# Patient Record
Sex: FEMALE | Race: WHITE | NOT HISPANIC OR LATINO | Employment: UNEMPLOYED | URBAN - NONMETROPOLITAN AREA
[De-identification: names, ages, dates, MRNs, and addresses within clinical notes are randomized per-mention and may not be internally consistent; named-entity substitution may affect disease eponyms.]

---

## 2019-02-01 ENCOUNTER — APPOINTMENT (OUTPATIENT)
Dept: MRI IMAGING | Facility: HOSPITAL | Age: 49
End: 2019-02-01

## 2019-02-01 ENCOUNTER — HOSPITAL ENCOUNTER (EMERGENCY)
Facility: HOSPITAL | Age: 49
Discharge: HOME | End: 2019-02-01
Attending: EMERGENCY MEDICINE | Admitting: EMERGENCY MEDICINE

## 2019-02-01 VITALS
DIASTOLIC BLOOD PRESSURE: 71 MMHG | OXYGEN SATURATION: 97 % | TEMPERATURE: 97.5 F | HEART RATE: 60 BPM | RESPIRATION RATE: 18 BRPM | WEIGHT: 263.2 LBS | SYSTOLIC BLOOD PRESSURE: 119 MMHG | BODY MASS INDEX: 38.98 KG/M2 | HEIGHT: 69 IN

## 2019-02-01 DIAGNOSIS — G89.29 CHRONIC LEFT-SIDED LOW BACK PAIN WITH LEFT-SIDED SCIATICA: Primary | ICD-10-CM

## 2019-02-01 DIAGNOSIS — M54.42 CHRONIC LEFT-SIDED LOW BACK PAIN WITH LEFT-SIDED SCIATICA: Primary | ICD-10-CM

## 2019-02-01 LAB
ANION GAP SERPL CALCULATED.3IONS-SCNC: 5 MMOL/L (ref 4–13)
BASOPHILS # BLD AUTO: 0.05 10*3/MM3 (ref 0–0.2)
BASOPHILS NFR BLD AUTO: 0.7 % (ref 0–2)
BUN BLD-MCNC: 13 MG/DL (ref 5–21)
BUN/CREAT SERPL: 14.8 (ref 7–25)
CALCIUM SPEC-SCNC: 9.2 MG/DL (ref 8.4–10.4)
CHLORIDE SERPL-SCNC: 104 MMOL/L (ref 98–110)
CO2 SERPL-SCNC: 29 MMOL/L (ref 24–31)
CREAT BLD-MCNC: 0.88 MG/DL (ref 0.5–1.4)
DEPRECATED RDW RBC AUTO: 40.4 FL (ref 40–54)
EOSINOPHIL # BLD AUTO: 0.12 10*3/MM3 (ref 0–0.7)
EOSINOPHIL NFR BLD AUTO: 1.6 % (ref 0–4)
ERYTHROCYTE [DISTWIDTH] IN BLOOD BY AUTOMATED COUNT: 13.2 % (ref 12–15)
GFR SERPL CREATININE-BSD FRML MDRD: 69 ML/MIN/1.73
GLUCOSE BLD-MCNC: 102 MG/DL (ref 70–100)
HCG SERPL QL: NEGATIVE
HCT VFR BLD AUTO: 39.6 % (ref 37–47)
HGB BLD-MCNC: 13.4 G/DL (ref 12–16)
IMM GRANULOCYTES # BLD AUTO: 0.06 10*3/MM3 (ref 0–0.03)
IMM GRANULOCYTES NFR BLD AUTO: 0.8 % (ref 0–5)
LYMPHOCYTES # BLD AUTO: 1.97 10*3/MM3 (ref 0.72–4.86)
LYMPHOCYTES NFR BLD AUTO: 26 % (ref 15–45)
MCH RBC QN AUTO: 28.4 PG (ref 28–32)
MCHC RBC AUTO-ENTMCNC: 33.8 G/DL (ref 33–36)
MCV RBC AUTO: 83.9 FL (ref 82–98)
MONOCYTES # BLD AUTO: 0.59 10*3/MM3 (ref 0.19–1.3)
MONOCYTES NFR BLD AUTO: 7.8 % (ref 4–12)
NEUTROPHILS # BLD AUTO: 4.79 10*3/MM3 (ref 1.87–8.4)
NEUTROPHILS NFR BLD AUTO: 63.1 % (ref 39–78)
NRBC BLD AUTO-RTO: 0 /100 WBC (ref 0–0)
PLATELET # BLD AUTO: 260 10*3/MM3 (ref 130–400)
PMV BLD AUTO: 10.4 FL (ref 6–12)
POTASSIUM BLD-SCNC: 4.2 MMOL/L (ref 3.5–5.3)
RBC # BLD AUTO: 4.72 10*6/MM3 (ref 4.2–5.4)
SODIUM BLD-SCNC: 138 MMOL/L (ref 135–145)
WBC NRBC COR # BLD: 7.58 10*3/MM3 (ref 4.8–10.8)
WHOLE BLOOD HOLD SPECIMEN: NORMAL

## 2019-02-01 PROCEDURE — 80048 BASIC METABOLIC PNL TOTAL CA: CPT | Performed by: EMERGENCY MEDICINE

## 2019-02-01 PROCEDURE — 25010000002 ONDANSETRON PER 1 MG: Performed by: EMERGENCY MEDICINE

## 2019-02-01 PROCEDURE — 96375 TX/PRO/DX INJ NEW DRUG ADDON: CPT

## 2019-02-01 PROCEDURE — 85025 COMPLETE CBC W/AUTO DIFF WBC: CPT | Performed by: EMERGENCY MEDICINE

## 2019-02-01 PROCEDURE — 99284 EMERGENCY DEPT VISIT MOD MDM: CPT

## 2019-02-01 PROCEDURE — 72148 MRI LUMBAR SPINE W/O DYE: CPT

## 2019-02-01 PROCEDURE — 96374 THER/PROPH/DIAG INJ IV PUSH: CPT

## 2019-02-01 PROCEDURE — 84703 CHORIONIC GONADOTROPIN ASSAY: CPT | Performed by: EMERGENCY MEDICINE

## 2019-02-01 RX ORDER — TIZANIDINE 4 MG/1
4 TABLET ORAL DAILY
Qty: 20 TABLET | Refills: 0 | Status: SHIPPED | OUTPATIENT
Start: 2019-02-01 | End: 2019-06-04

## 2019-02-01 RX ORDER — BUSPIRONE HYDROCHLORIDE 5 MG/1
5 TABLET ORAL 2 TIMES DAILY PRN
COMMUNITY
End: 2019-09-23

## 2019-02-01 RX ORDER — ONDANSETRON 2 MG/ML
4 INJECTION INTRAMUSCULAR; INTRAVENOUS ONCE
Status: COMPLETED | OUTPATIENT
Start: 2019-02-01 | End: 2019-02-01

## 2019-02-01 RX ORDER — SODIUM CHLORIDE 0.9 % (FLUSH) 0.9 %
10 SYRINGE (ML) INJECTION AS NEEDED
Status: DISCONTINUED | OUTPATIENT
Start: 2019-02-01 | End: 2019-02-02 | Stop reason: HOSPADM

## 2019-02-01 RX ORDER — TIZANIDINE 4 MG/1
4 TABLET ORAL DAILY
COMMUNITY
End: 2019-02-01 | Stop reason: SDUPTHER

## 2019-02-01 RX ORDER — HYDROMORPHONE HYDROCHLORIDE 1 MG/ML
0.5 INJECTION, SOLUTION INTRAMUSCULAR; INTRAVENOUS; SUBCUTANEOUS ONCE
Status: DISCONTINUED | OUTPATIENT
Start: 2019-02-01 | End: 2019-02-01

## 2019-02-01 RX ORDER — HYDROCODONE BITARTRATE AND ACETAMINOPHEN 5; 325 MG/1; MG/1
1 TABLET ORAL EVERY 6 HOURS PRN
COMMUNITY

## 2019-02-01 RX ORDER — METHYLPREDNISOLONE 4 MG/1
TABLET ORAL
Qty: 21 EACH | Refills: 0 | Status: SHIPPED | OUTPATIENT
Start: 2019-02-01 | End: 2019-06-04

## 2019-02-01 RX ADMIN — HYDROMORPHONE HYDROCHLORIDE 1 MG: 1 INJECTION, SOLUTION INTRAMUSCULAR; INTRAVENOUS; SUBCUTANEOUS at 18:11

## 2019-02-01 RX ADMIN — ONDANSETRON 4 MG: 2 INJECTION INTRAMUSCULAR; INTRAVENOUS at 21:01

## 2019-02-01 NOTE — ED PROVIDER NOTES
Subjective   This is a 48-year-old female who presents to the emergency department for evaluation of acute on chronic low back pain.  Patient indicates that she has a long history of back pain and around 6 years ago she had a decompressive laminectomy in [place].  She has residual right lower extremity radiculopathy and decreased sensation.  For the last approximately 4 weeks she has had worsening of her lumbar back pain with new radiculopathy on the left.  She has a history of mild incontinence of stool/urine but her pain, incontinence, and paresthesias on the left have been significantly worse in the last 24 hours.  She is describing radiation of discomfort and paresthesias around to the left thigh.  She does note decreased sensation to the groin as well which is new.  No fever or shaking chills.  She is not on blood thinners.  Denies IV drug use.  No fall or recent trauma.  She does not feel short of breath.  No cough or wheezing.  No chest pain, heart racing, or dizziness.  No abdominal pain.  Patient does intermittently feel nauseous but she has not been vomiting.  No dysuria or hematuria.  No diarrhea or constipation.  She did follow up with a new primary care provider several weeks ago and mentioned this issue but no outpatient imaging was scheduled or performed as there were other medical issues being addressed at the time.  She was planning to follow-up with this provider for imaging of her low back pain but as her symptoms have been significantly worse in the last 24 hours she has presented here.  She has no additional complaints at this time.            Review of Systems   All other systems reviewed and are negative.      Past Medical History:   Diagnosis Date   • Back injury    • Pituitary deficiency (CMS/HCC)        No Known Allergies    Past Surgical History:   Procedure Laterality Date   • BACK SURGERY     • CHOLECYSTECTOMY     • TONSILLECTOMY         History reviewed. No pertinent family  history.    Social History     Socioeconomic History   • Marital status:      Spouse name: Not on file   • Number of children: Not on file   • Years of education: Not on file   • Highest education level: Not on file   Tobacco Use   • Smoking status: Never Smoker   Substance and Sexual Activity   • Alcohol use: Yes     Comment: rarely           Objective   Physical Exam   Constitutional: She appears well-developed and well-nourished.   Eyes: EOM are normal. Pupils are equal, round, and reactive to light.   Neck: Normal range of motion. Neck supple. No JVD present. No tracheal deviation present.   Cardiovascular: Normal rate, regular rhythm and normal heart sounds.   Pulmonary/Chest: Effort normal and breath sounds normal. No respiratory distress. She has no wheezes. She exhibits no tenderness.   Abdominal: Soft. Bowel sounds are normal. She exhibits no distension. There is no tenderness. There is no guarding.   Musculoskeletal: She exhibits no edema or deformity.   Patient with diffuse lumbar discomfort radiating towards the left buttock.  Overlying skin evidence of rash, trauma, or cellulitis.   Neurological: She is alert.   Patient with decreased sensation in the buttocks on the left compared to the right.  Patient reports decreased sensation towards the rectum.  Normal deep tendon reflexes.  Normal position sense of the toes.  Decreased sensation over the left buttock, hip, and thigh.  Decreased muscle strength of the left lower extremity secondary to pain.   Skin: Skin is warm and dry. Capillary refill takes less than 2 seconds.   Psychiatric: She has a normal mood and affect. Her behavior is normal.   Nursing note and vitals reviewed.      Procedures           ED Course      Dilaudid ordered while we await imaging    Labs reviewed    MRI:  Left paracentral disc protrusion at L5-S1 with effacement of the descending left S1 nerve root  Disc protrusion and facet disease continues to mild bilateral foraminal  stenosis at L5-S1  Normal conus medullaris    Patient updated on all results  The Dilaudid has made her slightly nauseous  Zofran ordered  Discharge instructions provided              MDM  Patient with a known history of lumbar disease describing worsening of pain, radiculopathy, and saddle paresthesias/incontinence    MRI showing no emergent cord compression  Normal conus medullaris    Patient updated with imaging results  She does not need a stat neurosurgical consult  She does have hydrocodone at home   She is requesting a refill of Zanaflex  We will put her on a steroid taper   We will provide contact information for our neurosurgeon on call Dr. Marie as well as Dr. Morales of the [facility]          Final diagnoses:   Chronic left-sided low back pain with left-sided sciatica            Poli Galvan, DO  02/01/19 [id number]

## 2019-02-02 NOTE — DISCHARGE INSTRUCTIONS
Please read and follow all directions.  Tylenol or ibuprofen for discomfort as needed.  Norco for breakthrough pain.  Zanaflex for muscle cramps/spasms.  Start steroids and take as directed.  Take all home medications as previously prescribed.  Please contact your primary care provider and a spine surgeon in 2-3 days to arrange for outpatient follow-up.  If you do not have one you may use the list below.  Return to the emergency department sooner if symptoms worsen or for any additional concerns.      Follow up with one of the [facility] physician groups below to setup primary care. If you have trouble following up, please call the [facility] Nurse Line at [phone]    (Dr. Mason Hill DO, Dr. Alexander Nova DO,  MIMI Mnotez, and MIMI Montoya)  [facility], Primary Care   [address] [place] [address], [address] [address] [phone]     (Dr. Nehal Lopez MD, MIMI Martinez, and MIMI Ndiaye)  [facility], Primary Care   [address] [place] [address], [place] [address] [phone]    (Dr. Desmond Yost MD and Dr. Chang Aguilar MD)  [facility], Primary Care  [address], [address]  [phone]    (Dr. Tyree Coe MD)  [facility], Primary Care  [address] [facility], [place], [address] [phone]

## 2019-02-06 ENCOUNTER — OFFICE VISIT (OUTPATIENT)
Dept: NEUROSURGERY | Facility: CLINIC | Age: 49
End: 2019-02-06

## 2019-02-06 VITALS — BODY MASS INDEX: 40.16 KG/M2 | HEIGHT: 68 IN | WEIGHT: 265 LBS

## 2019-02-06 DIAGNOSIS — Z78.9 NON-TOBACCO USER: ICD-10-CM

## 2019-02-06 DIAGNOSIS — M79.7 FIBROMYALGIA: ICD-10-CM

## 2019-02-06 DIAGNOSIS — M51.36 DEGENERATION OF LUMBAR INTERVERTEBRAL DISC: ICD-10-CM

## 2019-02-06 DIAGNOSIS — E66.01 CLASS 3 SEVERE OBESITY DUE TO EXCESS CALORIES WITH SERIOUS COMORBIDITY AND BODY MASS INDEX (BMI) OF 40.0 TO 44.9 IN ADULT (HCC): ICD-10-CM

## 2019-02-06 DIAGNOSIS — M51.16 LUMBAR DISC HERNIATION WITH RADICULOPATHY: Primary | ICD-10-CM

## 2019-02-06 PROCEDURE — 99204 OFFICE O/P NEW MOD 45 MIN: CPT | Performed by: NEUROLOGICAL SURGERY

## 2019-02-06 PROCEDURE — 99401 PREV MED CNSL INDIV APPRX 15: CPT | Performed by: NEUROLOGICAL SURGERY

## 2019-02-06 NOTE — PROGRESS NOTES
Primary Care Provider: Alexander Zapata MD    Chief Complaint:   Chief Complaint   Patient presents with   • Back Pain     Chronic back pain x 25 years, recent onset of worsening L leg pain x 1 month. Complains of L buttock pain, L lateral thigh, anterior calf into bottom of L foot. Questionable numbness. Unable to bend L leg. History of bowel/bladder incontience since early 2000's, worsened since lumbar laminectomy in 2012. Long history of evaluation with Neurosurgeon's.        History of Present Illness  Marilee Davis is a 48 y.o. female is being seen for consultation today at the request of ED    Parker has a 25 year history of low back pain.  She has occasional flareups and these are treated with physical therapy and medication.  In 2012 she had a and L4/5 laminectomy performed for back pain and right radicular symptoms.  This was done by Dr. Anju Pierson at [facility].  She was living in [place] at the time and her  as a travel nurse.  She states that her pain in her leg never improved.  Her back pain did subside a little bit.    In 2017 she began to have left leg symptoms.  This resulted in an MRI on 5/2017.  This showed a central disc bulge.  She went back to Dr. Montes and was offered a pain pump.  She tried epidural injections which did not help.  These were performed by Dr. Raza mckeon.  This was a neurosurgeon that she was working for it [employer].    In 1/2019 she had her most recent flareup of left leg pain.  This worsened and lead to a [facility] emergency room visit in February 2019.  She is here for follow-up.    Currently she complains of a 25 year history of low back pain with radiation into her bilateral legs left greater than right.  Is currently a 5 out of 10 after sitting a Medrol Dosepak but sometimes gets up to a 9 out of 10.  It is 50% back pain 35% left leg pain and 50% right leg pain.  Currently it radiates into her left buttocks her lateral thigh into her groin  and then her anterior shin.  She denies top of the foot dorsal of the foot pain.  She also has numbness in her lateral thighs only but no numbness and tingling in her toes.  She has no loss of fine motor function.  She denies neck or upper extremity weakness or numbness.  She has altered gait which includes a limp and difficulty walking or bending her left leg.  She endorses bowel and bladder incontinence particularly bladder incontinence which she has had sincerely 2000.  She has been worked up by neurology without a cause.  This did not occur in conjunction with childbearing.    Her pain is exacerbated by sitting it is ameliorated about laying in the lateral position or standing.    Of note the patient also has a history of pituitary disorder.  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  She is also been given a diagnosis of fibromyalgia.  She is on medication on the BuSpar, for depression.  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  Her sister has been diagnosed with psoriatic arthritis.  The patient is previously had an inflammatory rheumatoid workup which showed an elevated sedimentation rate but no other specified marker.    Oswestry Disability Index (Matthew et al, 1980)     Score   Pain Intensity 3   Personal Care 3   Lifting 4   Walking 3   Sitting 5   Standing 5   Sleeping 4   Sex Life (if applicable) 5   Social Life 4   Traveling 4   Previous Treatment Yes   TOTAL = Score x2  (or 2.22 if one NA) 80       SCORE INTERPRETATION OF THE OSWESTRY LBP DISABILITY QUESTIONNAIRE     % These patients are either bed-bound or exaggerating their symptoms. This can be evaluated by careful observation of the patient during the  medical examination.      Review of Systems   Constitutional: Positive for activity change.   HENT: Positive for trouble swallowing.    Eyes: Negative.    Respiratory: Positive for choking and wheezing.     Cardiovascular: Positive for leg swelling.   Gastrointestinal: Positive for abdominal distention, anal bleeding, blood in stool, constipation, diarrhea and nausea.   Endocrine: Positive for cold intolerance and heat intolerance.   Genitourinary: Positive for difficulty urinating, menstrual problem, pelvic pain and urgency.   Musculoskeletal: Positive for arthralgias, back pain, gait problem, joint swelling, myalgias, neck pain and neck stiffness.   Skin: Positive for rash.   Allergic/Immunologic: Positive for environmental allergies.   Neurological: Positive for weakness, numbness and headaches.   Hematological: Negative.    Psychiatric/Behavioral: Positive for decreased concentration and sleep disturbance. The patient is nervous/anxious.        Past Medical History:   Diagnosis Date   • Arthritis    • Fibromyalgia    • Pituitary deficiency (CMS/HCC)        Past Surgical History:   Procedure Laterality Date   • CHOLECYSTECTOMY     • LUMBAR LAMINECTOMY  2012    L4-5 for R radicular pain, performed by Dr. Anju Pierson at [facility]   • TONSILLECTOMY         Family History: family history includes Arthritis in her sister; COPD in her mother; Cancer in her mother and sister; Diabetes in her sister; Heart disease in her mother; Hypertension in her mother; Kidney disease in her mother and sister.    Social History:  reports that  has never smoked. she has never used smokeless tobacco. She reports that she drinks alcohol. She reports that she does not use drugs.    Medications:    Current Outpatient Medications:   •  busPIRone (BUSPAR) 5 MG tablet, Take 5 mg by mouth 3 (Three) Times a Day., Disp: , Rfl:   •  HYDROcodone-acetaminophen (NORCO) 5-325 MG per tablet, Take 1 tablet by mouth Every 6 (Six) Hours As Needed., Disp: , Rfl:   •  MethylPREDNISolone (MEDROL, BERKLEY,) 4 MG tablet, Take as directed on package instructions., Disp: 21 each, Rfl: 0  •  tiZANidine (ZANAFLEX) 4 MG tablet, Take 1 tablet by mouth  Daily., Disp: 20 tablet, Rfl: 0    Allergies:  Patient has no known allergies.    Objective   Physical Exam   Constitutional: She appears well-developed and well-nourished.   obesity   HENT:   Head: Normocephalic and atraumatic.   Eyes: EOM are normal. Pupils are equal, round, and reactive to light.   Neck: Normal range of motion. Neck supple.   Pulmonary/Chest: Effort normal and breath sounds normal.   Abdominal: Soft.   Musculoskeletal: Normal range of motion.   Neurological:   Reflex Scores:       Tricep reflexes are 2+ on the right side and 2+ on the left side.       Bicep reflexes are 2+ on the right side and 2+ on the left side.       Brachioradialis reflexes are 2+ on the right side and 2+ on the left side.       Patellar reflexes are 2+ on the right side and 2+ on the left side.       Achilles reflexes are 2+ on the right side and 2+ on the left side.  Skin: Skin is warm and dry.     Neurologic Exam     Cranial Nerves     CN III, IV, VI   Pupils are equal, round, and reactive to light.  Extraocular motions are normal.     Motor Exam     Strength   Right iliopsoas: 4/5  Left iliopsoas: 4/5  Right quadriceps: 4/5  Left quadriceps: 4/5  Right hamstring: 3/5  Left hamstrin/5  Right anterior tibial: 3/5  Left anterior tibial: 4/5  Right gastroc: 3/5  Left gastroc: 4/5EHL 3/5 on right and left 5/5     Sensory Exam   Sensory deficit distribution on right: L4  Sensory deficit distribution on left: L5    Gait, Coordination, and Reflexes     Gait  Gait: (antalgic)    Reflexes   Right brachioradialis: 2+  Left brachioradialis: 2+  Right biceps: 2+  Left biceps: 2+  Right triceps: 2+  Left triceps: 2+  Right patellar: 2+  Left patellar: 2+  Right achilles: 2+  Left achilles: 2+  Right plantar: normal  Left plantar: normal  Right Barker: absent  Left Barker: absent      Imaging: (independent review and interpretation)    2017 - evidence of excellent decompression at L4/5.  There is a small central disc bulge at  L5/S1 without evidence of either foraminal or central stenosis.  Of note the patient was complaining of left radicular symptoms at this time.      2/2019 - repeat MRI, previously noted L5/S1 disc herniation is slightly asymmetric to the left.  Difficult to tell if this is a true change or imaging technique.  There is still no evidence of central stenosis.  And moderate foraminal stenosis at the left L5/S1.      ASSESSMENT and PLAN  Marilee Davis is a 48 y.o. female with a significant comorbidity pituitary anomaly, arthritis, fibromyalgia, and question mild demyelinating disease. She presents with a new problem of back pain with left greater than right radicular symptoms and urinary incontinence. Physical exam findings of giveaway weakness on her motor exam from the iliopsoas downward worse on the right, normal reflexes, and L4 sensory distribution loss and the right and questionable L5 sensory distribution loss on the left.  Her imaging shows mild disc herniation asymmetric to the left at L5/S1 but does not fully explain her symptoms.    Back pain with bilateral radicular symptoms:    DDX: is broad but includes ...  Acute (<6 weeks)  Most typically trauma related, compression fracture, myofascial pain, drug induced myalgias (statins, Neulasta, or phosphodiesterase type V inhibitors such as tadalafil).  Subacute (6 weeks - 3 months)  Infectious discitis, vertebral osteomyelitis, spinal epidural abscess, spinal tumor (intradural or extradural), multiple myeloma, sacroiliitis  Chronic (>3 months)  Degenerative spine: lumbar stenosis, spondylolisthesis with mechanical instability, degenerative facet arthropathy, coronal or sagittal spinal malalignment, failed back syndrome after surgery, flat back syndrome. Spondyloarthropathies including ankylosis spondylitis (HLA-B27), Paget's disease.  Fibromyalgia or other rheumatological disease. Malingering, conversion disorder and secondary gain are diagnoses of  exclusion.    Marilee's signs and symptoms are most concerning for fibromyalgia, secondary gain, spondylo-arthropathy including ankylosis spondylitis or other inflammatory disease including psoriatic arthritis given her synptoms of nondermatomal motor weakness and an RAUL out of proportion to physical exam.  Furthermore her imaging shows a small isolated left L5/S1 disc herniation which should cause S1 distribution pain and weakness.  Her newest complaint is of left sided L5 distribution pain.      A structural diagnosis is possible and only 50% of patients with chronic back pain.  These patients account for 85% of the cost of lost work and compensation. (Sergio. Back Pain and Sciatica. NEJM.1988; 318:291-300).    Marilee has no radiographic pathology to explain her pain syndrome. In the absence of pain in a dermatomal distribution, true neurological deficit, spinal instability, or sagittal or coronal spinal imbalance the indication for spine surgery are few.  We discussed this in detail with Marilee and she voiced understanding    TREATMENT RECOMMENDATIONS ...  PT/OT, prescription provided to patient  Massage and Chiropractic as tolerated  Nonsteroidal anti-inflammatories, 220mg naproxen sodium BID x 10 days  Referral to pain management was refused.  Inflammatory workup:  Previously performed with elevated sedrate only. Deferred by patient  Offered referral to weight management program which she refused.   Referral to neurology for questionable history of demyelinating disease.    Obesity Counseling  We discussed Marilee's current weight and the effect on her spine, including premature dis degeneration and increased anterior force on the lumbar spine resulting in worsening facet arthropathy.  Marilee has a BMI 40.0-49.9       Classification: class III obesity.  We spent 10 minutes in weight management counseling including discussing current weight, current diet, and exercise patterns.  Additional we set goals for weight  reduction.  Therefore above normal parameters. Recommendations include: educational material, exercise counseling and she refused referal to specialist.         Marilee was seen today for back pain.    Diagnoses and all orders for this visit:    Lumbar disc herniation with radiculopathy  -     Ambulatory Referral to Neurology  -     Ambulatory Referral to Physical Therapy Evaluate and treat (3 days a week x 3- weeks); Heat; Moist heat; Stretching, Strengthening; Full weight bearing    Degeneration of lumbar intervertebral disc  -     Ambulatory Referral to Neurology  -     Ambulatory Referral to Physical Therapy Evaluate and treat (3 days a week x 3- weeks); Heat; Moist heat; Stretching, Strengthening; Full weight bearing    Fibromyalgia  -     Ambulatory Referral to Neurology  -     Ambulatory Referral to Physical Therapy Evaluate and treat (3 days a week x 3- weeks); Heat; Moist heat; Stretching, Strengthening; Full weight bearing    Class 3 severe obesity due to excess calories with serious comorbidity and body mass index (BMI) of 40.0 to 44.9 in adult (CMS/[facility])    Non-tobacco user        Return if symptoms worsen or fail to improve.    Thank you for this Consultation and the opportunity to participate in Marilee's care.    Sincerely,  Deng Marie MD    Level of Risk: Moderate due to: undiagnosed new problem  MDM: Moderate Complexity  (Mod = 96199, High = 85857)

## 2019-02-06 NOTE — PATIENT INSTRUCTIONS
Back Exercises  The following exercises strengthen the muscles that help to support the back. They also help to keep the lower back flexible. Doing these exercises can help to prevent back pain or lessen existing pain.  If you have back pain or discomfort, try doing these exercises 2-3 times each day or as told by your health care provider. When the pain goes away, do them once each day, but increase the number of times that you repeat the steps for each exercise (do more repetitions). If you do not have back pain or discomfort, do these exercises once each day or as told by your health care provider.  Exercises  Single Knee to Chest    Repeat these steps 3-5 times for each le. Lie on your back on a firm bed or the floor with your legs extended.  2. Bring one knee to your chest. Your other leg should stay extended and in contact with the floor.  3. Hold your knee in place by grabbing your knee or thigh.  4. Pull on your knee until you feel a gentle stretch in your lower back.  5. Hold the stretch for 10-30 seconds.  6. Slowly release and straighten your leg.    Pelvic Tilt    Repeat these steps 5-10 times:  1. Lie on your back on a firm bed or the floor with your legs extended.  2. Bend your knees so they are pointing toward the ceiling and your feet are flat on the floor.  3. Tighten your lower abdominal muscles to press your lower back against the floor. This motion will tilt your pelvis so your tailbone points up toward the ceiling instead of pointing to your feet or the floor.  4. With gentle tension and even breathing, hold this position for 5-10 seconds.    Cat-Cow    Repeat these steps until your lower back becomes more flexible:  1. Get into a hands-and-knees position on a firm surface. Keep your hands under your shoulders, and keep your knees under your hips. You may place padding under your knees for comfort.  2. Let your head hang down, and point your tailbone toward the floor so your lower back  becomes rounded like the back of a cat.  3. Hold this position for 5 seconds.  4. Slowly lift your head and point your tailbone up toward the ceiling so your back forms a sagging arch like the back of a cow.  5. Hold this position for 5 seconds.    Press-Ups    Repeat these steps 5-10 times:  1. Lie on your abdomen (face-down) on the floor.  2. Place your palms near your head, about shoulder-width apart.  3. While you keep your back as relaxed as possible and keep your hips on the floor, slowly straighten your arms to raise the top half of your body and lift your shoulders. Do not use your back muscles to raise your upper torso. You may adjust the placement of your hands to make yourself more comfortable.  4. Hold this position for 5 seconds while you keep your back relaxed.  5. Slowly return to lying flat on the floor.    Bridges    Repeat these steps 10 times:  1. Lie on your back on a firm surface.  2. Bend your knees so they are pointing toward the ceiling and your feet are flat on the floor.  3. Tighten your buttocks muscles and lift your buttocks off of the floor until your waist is at almost the same height as your knees. You should feel the muscles working in your buttocks and the back of your thighs. If you do not feel these muscles, slide your feet 1-2 inches farther away from your buttocks.  4. Hold this position for 3-5 seconds.  5. Slowly lower your hips to the starting position, and allow your buttocks muscles to relax completely.    If this exercise is too easy, try doing it with your arms crossed over your chest.  Abdominal Crunches    Repeat these steps 5-10 times:  1. Lie on your back on a firm bed or the floor with your legs extended.  2. Bend your knees so they are pointing toward the ceiling and your feet are flat on the floor.  3. Cross your arms over your chest.  4. Tip your chin slightly toward your chest without bending your neck.  5. Tighten your abdominal muscles and slowly raise your  trunk (torso) high enough to lift your shoulder blades a tiny bit off of the floor. Avoid raising your torso higher than that, because it can put too much stress on your low back and it does not help to strengthen your abdominal muscles.  6. Slowly return to your starting position.    Back Lifts  Repeat these steps 5-10 times:  1. Lie on your abdomen (face-down) with your arms at your sides, and rest your forehead on the floor.  2. Tighten the muscles in your legs and your buttocks.  3. Slowly lift your chest off of the floor while you keep your hips pressed to the floor. Keep the back of your head in line with the curve in your back. Your eyes should be looking at the floor.  4. Hold this position for 3-5 seconds.  5. Slowly return to your starting position.    Contact a health care provider if:  · Your back pain or discomfort gets much worse when you do an exercise.  · Your back pain or discomfort does not lessen within 2 hours after you exercise.  If you have any of these problems, stop doing these exercises right away. Do not do them again unless your health care provider says that you can.  Get help right away if:  · You develop sudden, severe back pain. If this happens, stop doing the exercises right away. Do not do them again unless your health care provider says that you can.  This information is not intended to replace advice given to you by your health care provider. Make sure you discuss any questions you have with your health care provider.  Document Released: 01/25/2006 Document Revised: 04/26/2017 Document Reviewed: 02/11/2016  [employer] Patient Education © 2017 [employer]    We will refer you to Neurologist, we will contact you with an appointment once this is scheduled. Continue taking Medrol Mina. Order for physical therapy provided, this is patient's responsibility to schedule these appointments. Patient declines referral to Bariatric.

## 2019-06-04 ENCOUNTER — OFFICE VISIT (OUTPATIENT)
Dept: NEUROLOGY | Facility: CLINIC | Age: 49
End: 2019-06-04

## 2019-06-04 VITALS
HEART RATE: 82 BPM | WEIGHT: 265 LBS | DIASTOLIC BLOOD PRESSURE: 80 MMHG | BODY MASS INDEX: 40.16 KG/M2 | SYSTOLIC BLOOD PRESSURE: 140 MMHG | HEIGHT: 68 IN

## 2019-06-04 DIAGNOSIS — R20.0 EXTREMITY NUMBNESS: Primary | ICD-10-CM

## 2019-06-04 DIAGNOSIS — M62.81 MUSCLE WEAKNESS OF EXTREMITY: ICD-10-CM

## 2019-06-04 DIAGNOSIS — G95.9 MYELOPATHY (HCC): ICD-10-CM

## 2019-06-04 PROCEDURE — 99204 OFFICE O/P NEW MOD 45 MIN: CPT | Performed by: PHYSICIAN ASSISTANT

## 2019-06-04 RX ORDER — IBUPROFEN 800 MG/1
800 TABLET ORAL EVERY 6 HOURS PRN
COMMUNITY

## 2019-06-04 RX ORDER — DULOXETIN HYDROCHLORIDE 30 MG/1
30 CAPSULE, DELAYED RELEASE ORAL DAILY
Qty: 30 CAPSULE | Refills: 2 | Status: CANCELLED | OUTPATIENT
Start: 2019-06-04 | End: 2020-06-03

## 2019-06-11 DIAGNOSIS — M51.16 LUMBAR DISC HERNIATION WITH RADICULOPATHY: Primary | ICD-10-CM

## 2019-06-11 RX ORDER — DULOXETIN HYDROCHLORIDE 30 MG/1
30 CAPSULE, DELAYED RELEASE ORAL DAILY
Qty: 30 CAPSULE | Refills: 3 | Status: SHIPPED | OUTPATIENT
Start: 2019-06-11 | End: 2019-09-23 | Stop reason: SINTOL

## 2019-06-15 NOTE — PROGRESS NOTES
"Subjective   Marilee Davis is a 48 y.o. female is being seen for consultation today at the request of Deng Marie, *    The patient is concerned as to whether there is evidence for MS.  She reports that in the past she was told by a physician that she had a \"mild case\", no history of DMT, no follow up MRI, long history of chronic back pain and associated symptoms, records reviewed.      Neurologic Problem   The patient's primary symptoms include clumsiness, focal sensory loss, focal weakness, a loss of balance, memory loss, a visual change and weakness. This is a new problem. The current episode started more than 1 year ago. The neurological problem developed gradually. The problem has been gradually worsening since onset. There was left-sided and lower extremity focality noted. Associated symptoms include back pain, fatigue, nausea and neck pain. Past treatments include walking, position change, sleep and medication. The treatment provided no relief.        The following portions of the patient's history were reviewed and updated as appropriate: allergies, current medications, past family history, past medical history, past social history, past surgical history and problem list.    Review of Systems   Constitutional: Positive for fatigue.   HENT: Positive for trouble swallowing.    Eyes: Positive for visual disturbance.   Respiratory: Positive for cough and choking.    Cardiovascular: Negative.    Gastrointestinal: Positive for nausea.   Genitourinary: Negative.    Musculoskeletal: Positive for back pain, gait problem and neck pain.   Skin: Negative.    Allergic/Immunologic: Negative.    Neurological: Positive for focal weakness, weakness, numbness, headache, loss of balance and memory problem.   Hematological: Negative.    Psychiatric/Behavioral: Positive for dysphoric mood, memory loss and sleep disturbance. The patient is nervous/anxious.        Objective   Physical Exam   Constitutional: She is " oriented to person, place, and time.   HENT:   Head: Normocephalic.   Right Ear: Hearing and external ear normal.   Left Ear: Hearing and external ear normal.   Nose: Nose normal.   Mouth/Throat: Uvula is midline, oropharynx is clear and moist and mucous membranes are normal.   Eyes: Conjunctivae, EOM and lids are normal. Pupils are equal, round, and reactive to light.   Fundoscopic exam:       The right eye shows no hemorrhage and no papilledema. The right eye shows red reflex.        The left eye shows no hemorrhage and no papilledema. The left eye shows red reflex.   Neck: Trachea normal and phonation normal. No JVD present. Muscular tenderness present. Carotid bruit is not present. Decreased range of motion present.   Cardiovascular: Normal rate, regular rhythm and normal heart sounds.   No murmur heard.  Pulmonary/Chest: Effort normal and breath sounds normal.   Musculoskeletal:        Cervical back: She exhibits pain.        Lumbar back: She exhibits pain.   Neurological: She is alert and oriented to person, place, and time. She displays no atrophy and no tremor. A sensory deficit is present. No cranial nerve deficit. She exhibits normal muscle tone. Gait abnormal. Coordination normal. GCS eye subscore is 4. GCS verbal subscore is 5. GCS motor subscore is 6.   Reflex Scores:       Tricep reflexes are 2+ on the right side and 2+ on the left side.       Bicep reflexes are 2+ on the right side and 2+ on the left side.       Brachioradialis reflexes are 2+ on the right side and 2+ on the left side.       Patellar reflexes are 2+ on the right side and 2+ on the left side.       Achilles reflexes are 2+ on the right side and 2+ on the left side.  Give way weakness, patchy sensory decrease.   Skin: Skin is warm, dry and intact.   Psychiatric: Her speech is normal and behavior is normal. Judgment and thought content normal. Her mood appears anxious. Cognition and memory are normal.   Nursing note and vitals  reviewed.        Marilee was seen today for neurologic problem.    Diagnoses and all orders for this visit:    Extremity numbness  -     CBC & Differential  -     Comprehensive Metabolic Panel  -     Neuromyelitis Optica (NMO) Auto Antibody, IgG  -     JESSICA by IFA, Reflex 9-biomarkers profile  -     Sedimentation Rate  -     Hemoglobin A1c  -     MRI Brain With & Without Contrast; Future  -     MRI Cervical Spine With & Without Contrast; Future  -     MRI Thoracic Spine With & Without Contrast; Future    Muscle weakness of extremity  -     CBC & Differential  -     Comprehensive Metabolic Panel  -     Neuromyelitis Optica (NMO) Auto Antibody, IgG  -     JESSICA by IFA, Reflex 9-biomarkers profile  -     Sedimentation Rate  -     Hemoglobin A1c  -     MRI Brain With & Without Contrast; Future  -     MRI Cervical Spine With & Without Contrast; Future  -     MRI Thoracic Spine With & Without Contrast; Future    Myelopathy (CMS/HCC)  -     CBC & Differential  -     Comprehensive Metabolic Panel  -     Neuromyelitis Optica (NMO) Auto Antibody, IgG  -     JESSICA by IFA, Reflex 9-biomarkers profile  -     Sedimentation Rate  -     Hemoglobin A1c  -     MRI Brain With & Without Contrast; Future  -     MRI Cervical Spine With & Without Contrast; Future  -     MRI Thoracic Spine With & Without Contrast; Future      Today's finding, extensive patient questions and concerns are reviewed in detail.  Will see her back after the above testing.

## 2019-06-26 ENCOUNTER — APPOINTMENT (OUTPATIENT)
Dept: MRI IMAGING | Facility: HOSPITAL | Age: 49
End: 2019-06-26

## 2019-07-01 ENCOUNTER — APPOINTMENT (OUTPATIENT)
Dept: MRI IMAGING | Facility: HOSPITAL | Age: 49
End: 2019-07-01

## 2019-07-08 ENCOUNTER — HOSPITAL ENCOUNTER (OUTPATIENT)
Dept: MRI IMAGING | Facility: HOSPITAL | Age: 49
Discharge: HOME | End: 2019-07-08
Admitting: PHYSICIAN ASSISTANT

## 2019-07-08 ENCOUNTER — APPOINTMENT (OUTPATIENT)
Dept: LAB | Facility: HOSPITAL | Age: 49
End: 2019-07-08

## 2019-07-08 DIAGNOSIS — G95.9 MYELOPATHY (HCC): ICD-10-CM

## 2019-07-08 DIAGNOSIS — R20.0 EXTREMITY NUMBNESS: ICD-10-CM

## 2019-07-08 DIAGNOSIS — M62.81 MUSCLE WEAKNESS OF EXTREMITY: ICD-10-CM

## 2019-07-08 LAB
ALBUMIN SERPL-MCNC: 4.1 G/DL (ref 3.5–5)
ALBUMIN/GLOB SERPL: 1.3 G/DL (ref 1.1–2.5)
ALP SERPL-CCNC: 97 U/L (ref 24–120)
ALT SERPL W P-5'-P-CCNC: 33 U/L (ref 0–54)
ANION GAP SERPL CALCULATED.3IONS-SCNC: 5 MMOL/L (ref 4–13)
AST SERPL-CCNC: 37 U/L (ref 7–45)
BASOPHILS # BLD AUTO: 0.05 10*3/MM3 (ref 0–0.2)
BASOPHILS NFR BLD AUTO: 0.7 % (ref 0–2)
BILIRUB SERPL-MCNC: 0.6 MG/DL (ref 0.1–1)
BUN BLD-MCNC: 11 MG/DL (ref 5–21)
BUN/CREAT SERPL: 12.4 (ref 7–25)
CALCIUM SPEC-SCNC: 9.4 MG/DL (ref 8.4–10.4)
CHLORIDE SERPL-SCNC: 104 MMOL/L (ref 98–110)
CO2 SERPL-SCNC: 30 MMOL/L (ref 24–31)
CREAT BLD-MCNC: 0.89 MG/DL (ref 0.5–1.4)
CREAT BLDA-MCNC: 0.9 MG/DL (ref 0.6–1.3)
DEPRECATED RDW RBC AUTO: 40.4 FL (ref 40–54)
EOSINOPHIL # BLD AUTO: 0.12 10*3/MM3 (ref 0–0.7)
EOSINOPHIL NFR BLD AUTO: 1.6 % (ref 0–4)
ERYTHROCYTE [DISTWIDTH] IN BLOOD BY AUTOMATED COUNT: 13.2 % (ref 12–15)
ERYTHROCYTE [SEDIMENTATION RATE] IN BLOOD: 4 MM/HR (ref 0–20)
GFR SERPL CREATININE-BSD FRML MDRD: 68 ML/MIN/1.73
GLOBULIN UR ELPH-MCNC: 3.1 GM/DL
GLUCOSE BLD-MCNC: 104 MG/DL (ref 70–100)
HBA1C MFR BLD: 6.5 %
HCT VFR BLD AUTO: 39.8 % (ref 37–47)
HGB BLD-MCNC: 13.3 G/DL (ref 12–16)
IMM GRANULOCYTES # BLD AUTO: 0.04 10*3/MM3 (ref 0–0.05)
IMM GRANULOCYTES NFR BLD AUTO: 0.5 % (ref 0–5)
LYMPHOCYTES # BLD AUTO: 1.83 10*3/MM3 (ref 0.72–4.86)
LYMPHOCYTES NFR BLD AUTO: 23.9 % (ref 15–45)
MCH RBC QN AUTO: 28.4 PG (ref 28–32)
MCHC RBC AUTO-ENTMCNC: 33.4 G/DL (ref 33–36)
MCV RBC AUTO: 84.9 FL (ref 82–98)
MONOCYTES # BLD AUTO: 0.58 10*3/MM3 (ref 0.19–1.3)
MONOCYTES NFR BLD AUTO: 7.6 % (ref 4–12)
NEUTROPHILS # BLD AUTO: 5.03 10*3/MM3 (ref 1.87–8.4)
NEUTROPHILS NFR BLD AUTO: 65.7 % (ref 39–78)
NRBC BLD AUTO-RTO: 0 /100 WBC (ref 0–0.2)
PLATELET # BLD AUTO: 262 10*3/MM3 (ref 130–400)
PMV BLD AUTO: 10.3 FL (ref 6–12)
POTASSIUM BLD-SCNC: 4.1 MMOL/L (ref 3.5–5.3)
PROT SERPL-MCNC: 7.2 G/DL (ref 6.3–8.7)
RBC # BLD AUTO: 4.69 10*6/MM3 (ref 4.2–5.4)
SODIUM BLD-SCNC: 139 MMOL/L (ref 135–145)
WBC NRBC COR # BLD: 7.65 10*3/MM3 (ref 4.8–10.8)

## 2019-07-08 PROCEDURE — 83036 HEMOGLOBIN GLYCOSYLATED A1C: CPT | Performed by: PHYSICIAN ASSISTANT

## 2019-07-08 PROCEDURE — 86038 ANTINUCLEAR ANTIBODIES: CPT | Performed by: PHYSICIAN ASSISTANT

## 2019-07-08 PROCEDURE — A9577 INJ MULTIHANCE: HCPCS | Performed by: PHYSICIAN ASSISTANT

## 2019-07-08 PROCEDURE — 86255 FLUORESCENT ANTIBODY SCREEN: CPT | Performed by: PHYSICIAN ASSISTANT

## 2019-07-08 PROCEDURE — 80053 COMPREHEN METABOLIC PANEL: CPT | Performed by: PHYSICIAN ASSISTANT

## 2019-07-08 PROCEDURE — 85025 COMPLETE CBC W/AUTO DIFF WBC: CPT | Performed by: PHYSICIAN ASSISTANT

## 2019-07-08 PROCEDURE — 70553 MRI BRAIN STEM W/O & W/DYE: CPT

## 2019-07-08 PROCEDURE — 0 GADOBENATE DIMEGLUMINE 529 MG/ML SOLUTION: Performed by: PHYSICIAN ASSISTANT

## 2019-07-08 PROCEDURE — 82565 ASSAY OF CREATININE: CPT

## 2019-07-08 PROCEDURE — 85651 RBC SED RATE NONAUTOMATED: CPT | Performed by: PHYSICIAN ASSISTANT

## 2019-07-08 PROCEDURE — 36415 COLL VENOUS BLD VENIPUNCTURE: CPT | Performed by: PHYSICIAN ASSISTANT

## 2019-07-08 RX ADMIN — GADOBENATE DIMEGLUMINE 20 ML: 529 INJECTION, SOLUTION INTRAVENOUS at 13:43

## 2019-07-09 LAB
ANA SER QL IA: NEGATIVE
Lab: NORMAL

## 2019-07-16 LAB
Lab: NORMAL

## 2019-07-22 ENCOUNTER — HOSPITAL ENCOUNTER (OUTPATIENT)
Dept: MRI IMAGING | Facility: HOSPITAL | Age: 49
Discharge: HOME | End: 2019-07-22
Admitting: PHYSICIAN ASSISTANT

## 2019-07-22 ENCOUNTER — HOSPITAL ENCOUNTER (OUTPATIENT)
Dept: MRI IMAGING | Facility: HOSPITAL | Age: 49
Discharge: HOME | End: 2019-07-22

## 2019-07-22 DIAGNOSIS — R20.0 EXTREMITY NUMBNESS: ICD-10-CM

## 2019-07-22 DIAGNOSIS — G95.9 MYELOPATHY (HCC): ICD-10-CM

## 2019-07-22 DIAGNOSIS — M62.81 MUSCLE WEAKNESS OF EXTREMITY: ICD-10-CM

## 2019-07-22 PROCEDURE — A9577 INJ MULTIHANCE: HCPCS | Performed by: PHYSICIAN ASSISTANT

## 2019-07-22 PROCEDURE — 0 GADOBENATE DIMEGLUMINE 529 MG/ML SOLUTION: Performed by: PHYSICIAN ASSISTANT

## 2019-07-22 PROCEDURE — 72157 MRI CHEST SPINE W/O & W/DYE: CPT

## 2019-07-22 PROCEDURE — 72156 MRI NECK SPINE W/O & W/DYE: CPT

## 2019-07-22 RX ADMIN — GADOBENATE DIMEGLUMINE 20 ML: 529 INJECTION, SOLUTION INTRAVENOUS at 12:20

## 2019-09-23 ENCOUNTER — OFFICE VISIT (OUTPATIENT)
Dept: NEUROLOGY | Facility: CLINIC | Age: 49
End: 2019-09-23

## 2019-09-23 VITALS
SYSTOLIC BLOOD PRESSURE: 122 MMHG | HEIGHT: 68 IN | WEIGHT: 258 LBS | DIASTOLIC BLOOD PRESSURE: 82 MMHG | HEART RATE: 62 BPM | BODY MASS INDEX: 39.1 KG/M2

## 2019-09-23 DIAGNOSIS — M79.18 CHRONIC MUSCULOSKELETAL PAIN: ICD-10-CM

## 2019-09-23 DIAGNOSIS — M51.16 LUMBAR DISC HERNIATION WITH RADICULOPATHY: Primary | ICD-10-CM

## 2019-09-23 DIAGNOSIS — G89.29 CHRONIC MUSCULOSKELETAL PAIN: ICD-10-CM

## 2019-09-23 PROCEDURE — 99214 OFFICE O/P EST MOD 30 MIN: CPT | Performed by: PHYSICIAN ASSISTANT

## 2019-09-23 RX ORDER — TIZANIDINE 4 MG/1
4 TABLET ORAL NIGHTLY PRN
Qty: 30 TABLET | Refills: 3 | Status: SHIPPED | OUTPATIENT
Start: 2019-09-23

## 2019-09-23 RX ORDER — DULOXETIN HYDROCHLORIDE 20 MG/1
20 CAPSULE, DELAYED RELEASE ORAL DAILY
Qty: 30 CAPSULE | Refills: 4 | Status: SHIPPED | OUTPATIENT
Start: 2019-09-23 | End: 2021-04-23

## 2020-01-03 ENCOUNTER — OFFICE VISIT (OUTPATIENT)
Dept: CARDIOLOGY | Facility: CLINIC | Age: 50
End: 2020-01-03

## 2020-01-03 VITALS
SYSTOLIC BLOOD PRESSURE: 124 MMHG | HEIGHT: 68 IN | HEART RATE: 69 BPM | OXYGEN SATURATION: 98 % | BODY MASS INDEX: 39.1 KG/M2 | DIASTOLIC BLOOD PRESSURE: 84 MMHG | WEIGHT: 258 LBS

## 2020-01-03 DIAGNOSIS — R06.02 SHORTNESS OF BREATH: ICD-10-CM

## 2020-01-03 DIAGNOSIS — R60.0 LOWER EXTREMITY EDEMA: Primary | ICD-10-CM

## 2020-01-03 DIAGNOSIS — E66.09 CLASS 2 OBESITY DUE TO EXCESS CALORIES WITHOUT SERIOUS COMORBIDITY WITH BODY MASS INDEX (BMI) OF 39.0 TO 39.9 IN ADULT: ICD-10-CM

## 2020-01-03 PROCEDURE — 99243 OFF/OP CNSLTJ NEW/EST LOW 30: CPT | Performed by: INTERNAL MEDICINE

## 2020-01-03 PROCEDURE — 93000 ELECTROCARDIOGRAM COMPLETE: CPT | Performed by: INTERNAL MEDICINE

## 2020-01-03 RX ORDER — DOCUSATE SODIUM 100 MG/1
100 CAPSULE, LIQUID FILLED ORAL 2 TIMES DAILY
COMMUNITY
End: 2021-04-23

## 2020-01-03 NOTE — PROGRESS NOTES
Reason for Visit: Shortness of breath, cough, edema.    HPI:  Marilee Davis is a 49 y.o. female is being seen for consultation today at the request of MIMI Wallace for evaluation of shortness of breath, cough, and edema.  She recently had an echocardiogram in November that demonstrated normal left ventricular systolic function, mild LA enlargement, very mild prolapse of the posterior mitral valve leaflet with trace MR and trace TR.  She reports this is been present for quite some time.  She oftentimes notice it worsening when she is traveling or with prolonged sitting.  She reports when she is traveling she also tends to eat out and eat more processed foods.  She is planning to try to eat better and hoping to lose weight soon.  She denies any chest pain, palpitations, dizziness, syncope, PND, or orthopnea.    Previous Cardiac Testing and Procedures:  -Echo (11/4/2019) EF 55%, LV and upper limits of normal size, mild LA enlargement, very mild intermittent prolapse of the posterior aspect of mitral valve leaflet with trace MR, trace TR    Patient Active Problem List   Diagnosis   • Lumbar disc herniation with radiculopathy   • Degeneration of lumbar intervertebral disc   • Fibromyalgia   • Obesity due to excess calories   • Non-tobacco user   • Chronic musculoskeletal pain       Social History     Tobacco Use   • Smoking status: Never Smoker   • Smokeless tobacco: Never Used   Substance Use Topics   • Alcohol use: Yes   • Drug use: No       Family History   Problem Relation Age of Onset   • Cancer Mother    • Heart disease Mother    • Hypertension Mother    • Kidney disease Mother    • COPD Mother    • Arthritis Sister    • Cancer Sister    • Diabetes Sister    • Kidney disease Sister        The following portions of the patient's history were reviewed and updated as appropriate: allergies, current medications, past family history, past medical history, past social history, past surgical history and  "problem list.      Current Outpatient Medications:   •  Bisacodyl (LAXATIVE PO), Take  by mouth., Disp: , Rfl:   •  docusate sodium (COLACE) 100 MG capsule, Take 100 mg by mouth 2 (Two) Times a Day., Disp: , Rfl:   •  DULoxetine (CYMBALTA) 20 MG capsule, Take 1 capsule by mouth Daily., Disp: 30 capsule, Rfl: 4  •  HYDROcodone-acetaminophen (NORCO) 5-325 MG per tablet, Take 1 tablet by mouth Every 6 (Six) Hours As Needed., Disp: , Rfl:   •  ibuprofen (ADVIL,MOTRIN) 800 MG tablet, Take 800 mg by mouth Every 6 (Six) Hours As Needed for Mild Pain  or Moderate Pain ., Disp: , Rfl:   •  tiZANidine (ZANAFLEX) 4 MG tablet, Take 1 tablet by mouth At Night As Needed for Muscle Spasms., Disp: 30 tablet, Rfl: 3    Review of Systems   Constitution: Negative for chills, fever and weight loss.   HENT: Negative for sore throat.    Eyes: Negative for blurred vision and visual disturbance.   Cardiovascular: Positive for dyspnea on exertion and leg swelling. Negative for chest pain, palpitations, paroxysmal nocturnal dyspnea and syncope.   Respiratory: Positive for cough and shortness of breath.    Endocrine: Negative for cold intolerance and polyuria.   Skin: Negative for itching and rash.   Musculoskeletal: Positive for back pain. Negative for joint swelling and myalgias.   Gastrointestinal: Negative for abdominal pain, diarrhea and vomiting.   Genitourinary: Negative for dysuria and hematuria.   Neurological: Negative for dizziness, headaches and numbness.   Psychiatric/Behavioral: Negative for depression. The patient is not nervous/anxious.    Allergic/Immunologic: Negative for hives.       Objective   /84 (BP Location: Left arm, Patient Position: Sitting, Cuff Size: Adult)   Pulse 69   Ht 172.7 cm (68\")   Wt 117 kg (258 lb)   SpO2 98%   BMI 39.23 kg/m²   Physical Exam   Constitutional: She is oriented to person, place, and time. She appears well-developed and well-nourished.   HENT:   Head: Normocephalic and " atraumatic.   Eyes: Pupils are equal, round, and reactive to light. Conjunctivae and EOM are normal.   Neck: Normal range of motion. Neck supple. No JVD present. No thyromegaly present.   Cardiovascular: Normal rate, regular rhythm and normal heart sounds.   No murmur heard.  Pulmonary/Chest: Effort normal and breath sounds normal. She has no wheezes. She has no rales.   Abdominal: Soft. Bowel sounds are normal. She exhibits distension (obese). There is no tenderness.   Musculoskeletal: Normal range of motion. She exhibits edema (trace LE edema bilaterally).   Neurological: She is alert and oriented to person, place, and time. Coordination normal.   Skin: Skin is warm and dry. No rash noted.   Psychiatric: She has a normal mood and affect. Her behavior is normal.       ECG 12 Lead  Date/Time: 1/3/2020 8:55 AM  Performed by: Adriano Hopkins MD  Authorized by: Adriano Hopkins MD   Previous ECG: no previous ECG available  Rhythm: sinus rhythm  Rate: normal  Other findings: non-specific ST-T wave changes              ICD-10-CM ICD-9-CM   1. Lower extremity edema R60.0 782.3   2. Shortness of breath R06.02 786.05   3. Class 2 obesity due to excess calories without serious comorbidity with body mass index (BMI) of 39.0 to 39.9 in adult E66.09 278.00    Z68.39 V85.39         Assessment/Plan:  1.  Lower extremity edema: No evidence of cardiac etiology of edema recent echo.  Likely multifactorial due to venous insufficiency, obesity, and diet.  Recommend low-sodium diet, use of compression stockings, weight loss, and increased exercise.    2.  Shortness of breath: Relatively mild.  Likely due to combination of deconditioning and obesity.  Recommend gradual exercise regimen and weight loss.    3.  Obesity: Patient's Body mass index is 39.23 kg/m². BMI is above normal parameters. Recommendations include: exercise counseling and nutrition counseling.

## 2021-04-23 ENCOUNTER — OFFICE VISIT (OUTPATIENT)
Dept: NEUROLOGY | Facility: CLINIC | Age: 51
End: 2021-04-23

## 2021-04-23 VITALS
SYSTOLIC BLOOD PRESSURE: 126 MMHG | HEIGHT: 68 IN | OXYGEN SATURATION: 98 % | BODY MASS INDEX: 34.25 KG/M2 | DIASTOLIC BLOOD PRESSURE: 78 MMHG | HEART RATE: 68 BPM | WEIGHT: 226 LBS

## 2021-04-23 DIAGNOSIS — M51.36 DEGENERATION OF LUMBAR INTERVERTEBRAL DISC: ICD-10-CM

## 2021-04-23 DIAGNOSIS — R20.0 EXTREMITY NUMBNESS: ICD-10-CM

## 2021-04-23 DIAGNOSIS — K59.2 NEUROGENIC BOWEL, NOT ELSEWHERE CLASSIFIED: ICD-10-CM

## 2021-04-23 DIAGNOSIS — G95.9 MYELOPATHY (HCC): Primary | ICD-10-CM

## 2021-04-23 DIAGNOSIS — M51.16 LUMBAR DISC HERNIATION WITH RADICULOPATHY: ICD-10-CM

## 2021-04-23 PROCEDURE — 99214 OFFICE O/P EST MOD 30 MIN: CPT | Performed by: PHYSICIAN ASSISTANT

## 2021-08-16 ENCOUNTER — HOSPITAL ENCOUNTER (OUTPATIENT)
Dept: MRI IMAGING | Facility: HOSPITAL | Age: 51
Discharge: HOME | End: 2021-08-16

## 2021-08-16 DIAGNOSIS — M51.16 LUMBAR DISC HERNIATION WITH RADICULOPATHY: ICD-10-CM

## 2021-08-16 DIAGNOSIS — M51.36 DEGENERATION OF LUMBAR INTERVERTEBRAL DISC: ICD-10-CM

## 2021-08-16 DIAGNOSIS — G95.9 MYELOPATHY (HCC): ICD-10-CM

## 2021-08-16 DIAGNOSIS — K59.2 NEUROGENIC BOWEL, NOT ELSEWHERE CLASSIFIED: ICD-10-CM

## 2021-08-16 DIAGNOSIS — R20.0 EXTREMITY NUMBNESS: ICD-10-CM

## 2021-08-16 PROCEDURE — 70553 MRI BRAIN STEM W/O & W/DYE: CPT

## 2021-08-16 PROCEDURE — 82565 ASSAY OF CREATININE: CPT

## 2021-08-16 PROCEDURE — 72148 MRI LUMBAR SPINE W/O DYE: CPT

## 2021-08-16 PROCEDURE — A9577 INJ MULTIHANCE: HCPCS | Performed by: PHYSICIAN ASSISTANT

## 2021-08-16 PROCEDURE — 0 GADOBENATE DIMEGLUMINE 529 MG/ML SOLUTION: Performed by: PHYSICIAN ASSISTANT

## 2021-08-16 RX ADMIN — GADOBENATE DIMEGLUMINE 20 ML: 529 INJECTION, SOLUTION INTRAVENOUS at 12:56

## 2021-08-18 LAB — CREAT BLDA-MCNC: 0.9 MG/DL (ref 0.6–1.3)

## 2021-12-15 PROBLEM — G95.9 MYELOPATHY (HCC): Status: ACTIVE | Noted: 2021-12-15

## 2021-12-23 ENCOUNTER — OFFICE VISIT (OUTPATIENT)
Dept: NEUROLOGY | Facility: CLINIC | Age: 51
End: 2021-12-23

## 2021-12-23 VITALS
HEIGHT: 68 IN | BODY MASS INDEX: 39.1 KG/M2 | RESPIRATION RATE: 18 BRPM | HEART RATE: 72 BPM | WEIGHT: 258 LBS | SYSTOLIC BLOOD PRESSURE: 100 MMHG | DIASTOLIC BLOOD PRESSURE: 62 MMHG

## 2021-12-23 DIAGNOSIS — M51.16 LUMBAR DISC HERNIATION WITH RADICULOPATHY: ICD-10-CM

## 2021-12-23 DIAGNOSIS — M51.36 DEGENERATION OF LUMBAR INTERVERTEBRAL DISC: ICD-10-CM

## 2021-12-23 DIAGNOSIS — G95.9 MYELOPATHY (HCC): Primary | ICD-10-CM

## 2021-12-23 PROCEDURE — 99214 OFFICE O/P EST MOD 30 MIN: CPT | Performed by: PHYSICIAN ASSISTANT

## 2021-12-23 NOTE — PROGRESS NOTES
"Subjective   Marilee Davis is a 51 y.o. female is here today for follow-up.    The patient is concerned as to whether there is evidence for MS.  She reports that in the past she was told by a physician (Dr. Beck) that she had a \"mild case\", no history of DMT, no follow up MRI, long history of chronic back pain and associated symptoms, records reviewed.    NMO antibodies, labs are negative.  MRI of the brain shows no convincing evidence for demyelinating disease spinal MRI shows some degenerative changes with no evidence of cord disease and no evidence of demyelination.    The patient reports a previous LP was negative for MS, she is offered repeat LP today does not wish to proceed with this based on previous results and present imaging results.    The patient tolerated Cymbalta poorly at 30 mg but is willing to try 20 mg based on some of the positive effects.  She responds well to occasional Zanaflex when symptoms are worse.    Interim history.  The patient has a spine surgery appointment and ending opinion regarding her lumbar spine findings.  She continues to be symptomatic with regard to back pain and lower extremity pain with her right leg bothering her greater than her left which has been the case since prior to her L4-5 level surgery on the right in 2012 or 13.  The patient also has orthopedic surgery opinion regarding her knees pending.  The patient has had her studies and there is no evidence of multiple sclerosis at this time.  The patient does relate a family history of similar complaints of spine difficulties and issues in multiple members with urinary and fecal incontinence.       The following portions of the patient's history were reviewed and updated as appropriate: allergies, current medications, past family history, past medical history, past social history, past surgical history and problem list.    Review of Systems   Constitutional: Positive for fatigue.   Eyes: Negative.    Respiratory: " Negative.    Cardiovascular: Negative.    Gastrointestinal: Positive for nausea.   Genitourinary: Positive for urinary incontinence ( + Fecal).   Musculoskeletal: Positive for back pain, gait problem and neck pain.   Neurological: Positive for focal weakness, weakness and loss of balance.   Psychiatric/Behavioral: Positive for memory loss.         Current Outpatient Medications:   •  HYDROcodone-acetaminophen (NORCO) 5-325 MG per tablet, Take 1 tablet by mouth Every 6 (Six) Hours As Needed., Disp: , Rfl:   •  ibuprofen (ADVIL,MOTRIN) 800 MG tablet, Take 800 mg by mouth Every 6 (Six) Hours As Needed for Mild Pain  or Moderate Pain ., Disp: , Rfl:   •  metFORMIN (GLUCOPHAGE) 500 MG tablet, Take 500 mg by mouth 2 (Two) Times a Day With Meals., Disp: , Rfl:   •  Omega-3 Fatty Acids (FISH OIL PO), Take  by mouth Daily., Disp: , Rfl:   •  tiZANidine (ZANAFLEX) 4 MG tablet, Take 1 tablet by mouth At Night As Needed for Muscle Spasms., Disp: 30 tablet, Rfl: 3     Objective   Physical Exam  Vitals and nursing note reviewed.   HENT:      Head: Normocephalic.      Right Ear: Hearing and external ear normal.      Left Ear: Hearing and external ear normal.      Nose: Nose normal.      Mouth/Throat:      Pharynx: Oropharynx is clear.   Eyes:      General: Lids are normal. Vision grossly intact. Gaze aligned appropriately. No scleral icterus.     Extraocular Movements: Extraocular movements intact.      Conjunctiva/sclera: Conjunctivae normal.      Pupils: Pupils are equal, round, and reactive to light.      Visual Fields: Right eye visual fields normal and left eye visual fields normal.   Neck:      Vascular: No carotid bruit or JVD.      Trachea: Trachea and phonation normal.   Cardiovascular:      Rate and Rhythm: Normal rate.      Heart sounds: Normal heart sounds.   Pulmonary:      Effort: Pulmonary effort is normal.      Breath sounds: Normal breath sounds.   Musculoskeletal:      Cervical back: Normal range of motion.    Skin:     General: Skin is warm and dry.   Neurological:      Mental Status: She is alert and oriented to person, place, and time.      GCS: GCS eye subscore is 4. GCS verbal subscore is 5. GCS motor subscore is 6.      Cranial Nerves: Cranial nerves are intact.      Sensory: Sensation is intact.      Motor: Motor function is intact.      Coordination: Coordination is intact.      Gait: Gait is intact.      Deep Tendon Reflexes: Reflexes are normal and symmetric.   Psychiatric:         Attention and Perception: Attention and perception normal.         Mood and Affect: Mood and affect normal.         Speech: Speech normal.         Behavior: Behavior normal.         Thought Content: Thought content normal.         Cognition and Memory: Cognition and memory normal.         Judgment: Judgment normal.       MRI Brain With & Without Contrast (07/08/2019 13:51)     MRI Thoracic Spine With & Without Contrast (07/22/2019 12:33)     MRI Lumbar Spine Without Contrast (08/16/2021 12:46)     MRI Brain With & Without Contrast (08/16/2021 12:55)         Assessment/Plan   Diagnoses and all orders for this visit:    1. Myelopathy (HCC) (Primary)    2. Lumbar disc herniation with radiculopathy    3. Degeneration of lumbar intervertebral disc    The patient appears to be neurologically stable.  I do not identify any other intervention from our standpoint that would be beneficial.  I do think that this patient should be monitored and we will plan on seeing her back in follow-up.    Today's findings and her radiographic results as well as her laboratory results are reviewed in detail.                 Dictated utilizing Dragon dictation.